# Patient Record
Sex: FEMALE | Race: WHITE | Employment: PART TIME | RURAL
[De-identification: names, ages, dates, MRNs, and addresses within clinical notes are randomized per-mention and may not be internally consistent; named-entity substitution may affect disease eponyms.]

---

## 2022-01-11 ENCOUNTER — ANESTHESIA EVENT (OUTPATIENT)
Dept: SURGERY | Age: 67
End: 2022-01-11
Payer: MEDICARE

## 2022-01-12 NOTE — ANESTHESIA PREPROCEDURE EVALUATION
Relevant Problems   No relevant active problems       Anesthetic History   No history of anesthetic complications            Review of Systems / Medical History  Patient summary reviewed, nursing notes reviewed and pertinent labs reviewed    Pulmonary  Within defined limits            Pertinent negatives: No sleep apnea     Neuro/Psych         Psychiatric history (depression)     Cardiovascular    Hypertension          Hyperlipidemia    Exercise tolerance: >4 METS     GI/Hepatic/Renal             Pertinent negatives: No hiatal hernia and GERD   Endo/Other        Morbid obesity (BMI 40)     Other Findings            Physical Exam    Airway  Mallampati: II  TM Distance: > 6 cm  Neck ROM: normal range of motion   Mouth opening: Normal     Cardiovascular    Rhythm: regular  Rate: normal         Dental  No notable dental hx       Pulmonary  Breath sounds clear to auscultation               Abdominal  GI exam deferred       Other Findings            Anesthetic Plan    ASA: 3  Anesthesia type: MAC          Induction: Intravenous  Anesthetic plan and risks discussed with: Patient

## 2022-01-13 RX ORDER — SIMVASTATIN 40 MG/1
TABLET, FILM COATED ORAL
COMMUNITY

## 2022-01-13 RX ORDER — CHOLECALCIFEROL (VITAMIN D3) 125 MCG
220 CAPSULE ORAL DAILY
COMMUNITY

## 2022-01-13 RX ORDER — LANOLIN ALCOHOL/MO/W.PET/CERES
1000 CREAM (GRAM) TOPICAL DAILY
COMMUNITY

## 2022-01-13 RX ORDER — BUPROPION HYDROCHLORIDE 300 MG/1
300 TABLET ORAL DAILY
COMMUNITY

## 2022-01-13 RX ORDER — HYDROCHLOROTHIAZIDE 25 MG/1
25 TABLET ORAL DAILY
COMMUNITY

## 2022-01-13 RX ORDER — ESCITALOPRAM OXALATE 20 MG/1
20 TABLET ORAL
COMMUNITY

## 2022-01-13 RX ORDER — CHOLECALCIFEROL (VITAMIN D3) 50 MCG
CAPSULE ORAL DAILY
COMMUNITY

## 2022-01-13 NOTE — PERIOP NOTES
[address] [place] [address]  SURGICAL PRE-ADMISSION INSTRUCTIONS    ARRIVAL  · You will be called the day before your surgery with your expected arrival time. · Sign in at the  of the hospital.  You will be directed to the Surgical Waiting Room. · Please arrive at your scheduled appointment time. You have been scheduled to arrive for your procedure one or two hours prior to the expected start time of your procedure. · Every effort will be made to minimize your wait but please be aware that unforeseen circumstances may affect our schedule. EATING  · DO NOT EAT OR DRINK ANYTHING AFTER MIDNIGHT ON THE EVENING BEFORE YOUR SURGERY OR ON THE DAY OF YOUR SURGERY except for your medications (as instructed) with a sip of water. · Do not use gum, mints or lozenges on the morning of your surgery. · Please do not smoke or chew tobacco before your surgery. MEDICATIONS   · none    STOP THESE MEDICATIONS AT THE TIMES LISTED BELOW  aleve     DRIVING/TRANSPORATION  · Have a responsible adult to drive you home from the hospital and to stay with you over night. Please have them plan to remain in the hospital during your surgery. Your surgery will not be done if you do not have a responsible adult to take you home and to stay with you. · If you have arranged for public transport, you must have a responsible adult to ride with you (who is not the ). · You may not drive for 24 hours after anesthesia. PREPARATION  · If you have a Living WiIl/Advance Directive, please bring a copy with you to scan into your chart. · Please DO NOT wear makeup or nail polish  · Please leave valuables at home,  DO NOT wear jewelry. · Wear loose, comfortable clothing that is large enough to cover a bulky dressing.     SPECIAL INSTRUCTIONS:      Patient:  Dina Dixon   Date:     January 13, 2022  Time:   2:46 PM    RN:  Corey Cooley RN    Date:     January 13, 2022  Time:   2:46 PM

## 2022-01-20 ENCOUNTER — HOSPITAL ENCOUNTER (OUTPATIENT)
Dept: PREADMISSION TESTING | Age: 67
Discharge: HOME | End: 2022-01-20
Attending: OPHTHALMOLOGY
Payer: MEDICARE

## 2022-01-20 PROCEDURE — U0005 INFEC AGEN DETEC AMPLI PROBE: HCPCS

## 2022-01-21 LAB
SARS-COV-2, XPLCVT: NOT DETECTED
SOURCE, COVRS: NORMAL

## 2022-01-23 PROBLEM — H25.11 AGE-RELATED NUCLEAR CATARACT, RIGHT EYE: Chronic | Status: ACTIVE | Noted: 2022-01-23

## 2022-01-24 ENCOUNTER — ANESTHESIA (OUTPATIENT)
Dept: SURGERY | Age: 67
End: 2022-01-24
Payer: MEDICARE

## 2022-01-24 ENCOUNTER — HOSPITAL ENCOUNTER (OUTPATIENT)
Age: 67
Setting detail: OUTPATIENT SURGERY
Discharge: HOME | End: 2022-01-24
Attending: OPHTHALMOLOGY | Admitting: OPHTHALMOLOGY
Payer: MEDICARE

## 2022-01-24 VITALS
RESPIRATION RATE: 16 BRPM | OXYGEN SATURATION: 100 % | HEART RATE: 61 BPM | SYSTOLIC BLOOD PRESSURE: 146 MMHG | BODY MASS INDEX: 40.82 KG/M2 | TEMPERATURE: 97.4 F | HEIGHT: 66 IN | DIASTOLIC BLOOD PRESSURE: 56 MMHG | WEIGHT: 254 LBS

## 2022-01-24 PROBLEM — H25.11 AGE-RELATED NUCLEAR CATARACT, RIGHT EYE: Chronic | Status: RESOLVED | Noted: 2022-01-23 | Resolved: 2022-01-24

## 2022-01-24 PROCEDURE — 77030035283: Performed by: OPHTHALMOLOGY

## 2022-01-24 PROCEDURE — 74011250636 HC RX REV CODE- 250/636: Performed by: ANESTHESIOLOGY

## 2022-01-24 PROCEDURE — 74011250636 HC RX REV CODE- 250/636: Performed by: OPHTHALMOLOGY

## 2022-01-24 PROCEDURE — V2632 POST CHMBR INTRAOCULAR LENS: HCPCS | Performed by: OPHTHALMOLOGY

## 2022-01-24 PROCEDURE — 76210000020 HC REC RM PH II FIRST 0.5 HR: Performed by: OPHTHALMOLOGY

## 2022-01-24 PROCEDURE — 76060000031 HC ANESTHESIA FIRST 0.5 HR: Performed by: OPHTHALMOLOGY

## 2022-01-24 PROCEDURE — 2709999900 HC NON-CHARGEABLE SUPPLY: Performed by: OPHTHALMOLOGY

## 2022-01-24 PROCEDURE — 77030014067 HC TIP PHACO KLMN ALCN -B: Performed by: OPHTHALMOLOGY

## 2022-01-24 PROCEDURE — 77030007855 HC KNF OPHTH IKNF ALCN -A: Performed by: OPHTHALMOLOGY

## 2022-01-24 PROCEDURE — 77030013987 HC SLV OPTH IRR ALCN -B: Performed by: OPHTHALMOLOGY

## 2022-01-24 PROCEDURE — 76010000154 HC OR TIME FIRST 0.5 HR: Performed by: OPHTHALMOLOGY

## 2022-01-24 PROCEDURE — 77030013353: Performed by: OPHTHALMOLOGY

## 2022-01-24 PROCEDURE — 77030018831 HC SOL IRR H20 BAXT -A: Performed by: OPHTHALMOLOGY

## 2022-01-24 PROCEDURE — 74011000250 HC RX REV CODE- 250: Performed by: OPHTHALMOLOGY

## 2022-01-24 PROCEDURE — 77030020828: Performed by: OPHTHALMOLOGY

## 2022-01-24 DEVICE — LENS IOL POST 1-PC 6X13 24.5 -- ACRYSOF: Type: IMPLANTABLE DEVICE | Site: EYE | Status: FUNCTIONAL

## 2022-01-24 RX ORDER — MIDAZOLAM HYDROCHLORIDE 1 MG/ML
INJECTION, SOLUTION INTRAMUSCULAR; INTRAVENOUS AS NEEDED
Status: DISCONTINUED | OUTPATIENT
Start: 2022-01-24 | End: 2022-01-24 | Stop reason: HOSPADM

## 2022-01-24 RX ORDER — TROPICAMIDE 10 MG/ML
1 SOLUTION/ DROPS OPHTHALMIC
Status: COMPLETED | OUTPATIENT
Start: 2022-01-24 | End: 2022-01-24

## 2022-01-24 RX ORDER — TETRACAINE HYDROCHLORIDE 5 MG/ML
1 SOLUTION OPHTHALMIC AS NEEDED
Status: COMPLETED | OUTPATIENT
Start: 2022-01-24 | End: 2022-01-24

## 2022-01-24 RX ORDER — LIDOCAINE HYDROCHLORIDE 20 MG/ML
2 INJECTION, SOLUTION EPIDURAL; INFILTRATION; INTRACAUDAL; PERINEURAL ONCE
Status: COMPLETED | OUTPATIENT
Start: 2022-01-24 | End: 2022-01-24

## 2022-01-24 RX ORDER — SODIUM CHLORIDE, SODIUM LACTATE, POTASSIUM CHLORIDE, CALCIUM CHLORIDE 600; 310; 30; 20 MG/100ML; MG/100ML; MG/100ML; MG/100ML
25 INJECTION, SOLUTION INTRAVENOUS CONTINUOUS
Status: DISCONTINUED | OUTPATIENT
Start: 2022-01-24 | End: 2022-01-24 | Stop reason: HOSPADM

## 2022-01-24 RX ORDER — TOBRAMYCIN AND DEXAMETHASONE 3; 1 MG/ML; MG/ML
1 SUSPENSION/ DROPS OPHTHALMIC AS NEEDED
Status: COMPLETED | OUTPATIENT
Start: 2022-01-24 | End: 2022-01-24

## 2022-01-24 RX ORDER — TETRACAINE HYDROCHLORIDE 5 MG/ML
SOLUTION OPHTHALMIC AS NEEDED
Status: DISCONTINUED | OUTPATIENT
Start: 2022-01-24 | End: 2022-01-24 | Stop reason: HOSPADM

## 2022-01-24 RX ORDER — PHENYLEPHRINE HYDROCHLORIDE 100 MG/ML
1 SOLUTION/ DROPS OPHTHALMIC
Status: COMPLETED | OUTPATIENT
Start: 2022-01-24 | End: 2022-01-24

## 2022-01-24 RX ORDER — KETOROLAC TROMETHAMINE 5 MG/ML
1 SOLUTION OPHTHALMIC
Status: COMPLETED | OUTPATIENT
Start: 2022-01-24 | End: 2022-01-24

## 2022-01-24 RX ORDER — CHOLECALCIFEROL TAB 125 MCG (5000 UNIT) 125 MCG
5000 TAB ORAL DAILY
COMMUNITY

## 2022-01-24 RX ADMIN — KETOROLAC TROMETHAMINE 1 DROP: 0.5 SOLUTION OPHTHALMIC at 12:53

## 2022-01-24 RX ADMIN — SODIUM CHLORIDE, POTASSIUM CHLORIDE, SODIUM LACTATE AND CALCIUM CHLORIDE 25 ML/HR: 600; 310; 30; 20 INJECTION, SOLUTION INTRAVENOUS at 12:34

## 2022-01-24 RX ADMIN — TETRACAINE HYDROCHLORIDE 1 DROP: 5 SOLUTION OPHTHALMIC at 12:39

## 2022-01-24 RX ADMIN — TROPICAMIDE 1 DROP: 10 SOLUTION/ DROPS OPHTHALMIC at 12:53

## 2022-01-24 RX ADMIN — LIDOCAINE HYDROCHLORIDE 2 DROP: 35 GEL OPHTHALMIC at 12:39

## 2022-01-24 RX ADMIN — LIDOCAINE HYDROCHLORIDE 2 DROP: 35 GEL OPHTHALMIC at 12:53

## 2022-01-24 RX ADMIN — TETRACAINE HYDROCHLORIDE 1 DROP: 5 SOLUTION OPHTHALMIC at 12:52

## 2022-01-24 RX ADMIN — LIDOCAINE HYDROCHLORIDE 2 DROP: 35 GEL OPHTHALMIC at 12:28

## 2022-01-24 RX ADMIN — PHENYLEPHRINE HYDROCHLORIDE 1 DROP: 100 SOLUTION/ DROPS OPHTHALMIC at 12:28

## 2022-01-24 RX ADMIN — KETOROLAC TROMETHAMINE 1 DROP: 0.5 SOLUTION OPHTHALMIC at 12:29

## 2022-01-24 RX ADMIN — TROPICAMIDE 1 DROP: 10 SOLUTION/ DROPS OPHTHALMIC at 12:39

## 2022-01-24 RX ADMIN — SODIUM CHLORIDE, POTASSIUM CHLORIDE, SODIUM LACTATE AND CALCIUM CHLORIDE: 600; 310; 30; 20 INJECTION, SOLUTION INTRAVENOUS at 13:04

## 2022-01-24 RX ADMIN — MIDAZOLAM 1 MG: 1 INJECTION INTRAMUSCULAR; INTRAVENOUS at 13:06

## 2022-01-24 RX ADMIN — KETOROLAC TROMETHAMINE 1 DROP: 0.5 SOLUTION OPHTHALMIC at 12:39

## 2022-01-24 RX ADMIN — PHENYLEPHRINE HYDROCHLORIDE 1 DROP: 100 SOLUTION/ DROPS OPHTHALMIC at 12:52

## 2022-01-24 RX ADMIN — TETRACAINE HYDROCHLORIDE 1 DROP: 5 SOLUTION OPHTHALMIC at 12:28

## 2022-01-24 RX ADMIN — MIDAZOLAM 1 MG: 1 INJECTION INTRAMUSCULAR; INTRAVENOUS at 13:12

## 2022-01-24 RX ADMIN — TROPICAMIDE 1 DROP: 10 SOLUTION/ DROPS OPHTHALMIC at 12:28

## 2022-01-24 RX ADMIN — PHENYLEPHRINE HYDROCHLORIDE 1 DROP: 100 SOLUTION/ DROPS OPHTHALMIC at 12:39

## 2022-01-24 NOTE — INTERVAL H&P NOTE
The patient was counseled at length about the risks of rajendra Covid-19 during their perioperative period and any recovery window from their procedure. The patient was made aware that rajendra Covid-19  may worsen their prognosis for recovering from their procedure and lend to a higher morbidity and/or mortality risk. All material risks, benefits, and reasonable alternatives including postponing the procedure were discussed. The patient does  wish to proceed with the procedure at this time. Update History & Physical    The Patient's History and Physical of January 6, 2022 was reviewed with the patient and I examined the patient. There was no change. The surgical site was confirmed by the patient and me. Plan:  The risk, benefits, expected outcome, and alternative to the recommended procedure have been discussed with the patient. Patient understands and wants to proceed with the procedure.     Electronically signed by Gerome Duverney, MD on 1/24/2022 at 12:52 PM

## 2022-01-24 NOTE — BRIEF OP NOTE
Brief Postoperative Note    Patient: Candance Seashore  YOB: 1955  MRN: [id number]    Date of Procedure: 1/24/2022     Pre-Op Diagnosis: RIGHT EYE CATARACT    Post-Op Diagnosis: pseudophakia righ eye      Procedure(s):  RIGHT EYE EXTRACAPSULAR CATARACT REMOVAL WITH INSERTION OF INTRA OCULAR LENS IMPLANT (MAC/TOPICAL)    Surgeon(s):  Blas Keenan MD    Surgical Assistant: None    Anesthesia: MAC     Estimated Blood Loss (mL): none    Complications: None    Specimens: * No specimens in log *     Implants:   Implant Name Type Inv.  Item Serial No.  Lot No. LRB No. Used Action   AcrySof IQ Intraocular Lens  [id number] [address] [place] [address]  Right 1 Implanted       Drains: * No LDAs found *    Findings: cataract    Electronically Signed by Laura Rollins MD on 1/24/2022 at 1:32 PM

## 2022-01-24 NOTE — DISCHARGE INSTRUCTIONS
[facility]      POST OPERATIVE INSTRUCTIONS AND INFORMATION         1. THE FIRST 24 HOURS AFTER SURGERY, YOU WILL BE ASKED TO REMAIN AT BEDREST WITH YOUR HEAD ELEVATED AT 39 DEGREES THIS CAN BE DONE BY SLEEPING ON THE PILLOWS OR IN A RECLINER. YOU CAN GET UP AS NECESSARY. IF AN EYE SHIELD IS PLACED, IT  MUST REMAIN FIRMLY IN PLACE FOR THE FIRST 24 HOURS. IT WILL BE REMOVED IN THE OFFICE ON THE DAY FOLLOWING SURGERY WHEN I EXAMINE YOUR EYE. IF YOU ARE DISCHARGED WITH SUN GLASSES, THEY ARE TO BE WORN UNTIL BEDTIME WHEN AN EYE SHIELD IS TO BE TAPED OVER THE OPERATED EYE FOR SLEEP. 2.  YOU WILL NEED TO BRING ALL EYE MEDICATIONS TO YOUR APPOINTMENT THE DAY AFTER SURGERY. 3.   YOUR POST OP VISIT SCHEDULE IS AS FOLLOWS:             * ONE DAY AFTER SURGERY             * ONE WEEK AFTER SURGERY             * SIX WEEKS AFTER SURGERY (SPECTACLE REFRACTION AND DILATED EXAMINATION)    4. IT IS IMPORTANT THAT YOU WEAR EYE PROTECTION AT ALL TIMES FOR THE      FIRST WEEK AFTER [facility] CATARACT SURGERY. DURING THE DAY, YOU WILL NEED TO WEAR EITHER OUR CURRENT SPECTACLES WITH A PLAIN WINDOW GLASS LENS OR YOU MAY WISH TO PURCHASE A PAIR OF DRUG STORE BIFOCALS WITH A POWER OF +2.50 OR SO. WHEN OUTSIDE, YOU MUST WEAR THE SOLAR RODRIGUEZ THAT ARE PROVIDED FOR YOU. AT BEDTIME, YOU MUST WEAR THE EYE SHIELD THAT IS ALSO PROVIDED. AGAIN THIS IS FOR THE FIRST WEEK FOLLOWING YOUR SURGERY.     5. IMPORTANT DOS AND DONTS:             *AVOID CONSTIPATION             *DO NOT PARTICIPATE IN SPORTS OR STRENUOUS PHYSICAL ACTIVITY INCLUDING                         SEXUAL RELATIONS             *DO NOT DRIVE FOR ONE WEEK OFTER EACH CATARACT SURGERY             *AVOID POWDERS, SPRAYS, OR OTHER THINGS THAT MIGHT GET IN YOUR EYES             *DO NOT RUB YOUR EYE OR PUT ANY PRESSURE ON YOUR EYE    6. IF YOUR HAIR IS WASHED BY SOMEONE ELSE, HAVE THEM POSITION YOU SO THAT YOU LEAN YOUR HEAD BACKWARDS INTO THE SINK TO AVOID SOAPY WATER FROM GETTING IN YOUR EYES. YOU SHOULD ALSO WEAR YOUR EYE SHIELD TO PREVENT INJURY OR CONTAMINATION TO THE EYE. 7. DO NOT HESITATE TO CALL OUR OFFICE IF YOU FEEL SOMETHING IS NOT RIGHT OR YOU HAVE ANY QUESTIONS, UNUSUAL SYMPTOMS, OR SUDDEN CHANGES IN YOUR VISION. 8. OUR TELEPHONE [facility] OFFICE              [phone]              *[facility] OFFICE         [phone]    9. [address] YOU FOR ENTRUSTING YOUR EYE CARE TO US.    10.  YOU HAVE RECEIVED SEDATION AS PART OF YOUR PROCEDURE. NO DRIVING OR OPERATING HEAVY MACHINERY FOR 24 HOURS. YOUR                       BALANCE AND JUDGEMENT MAY BE IMPAIRED. DROWSINESS MAY ALSO OCCUR. [address] [place] [address] Rd  [phone]      ACTIVITY:    · Please, Bed rest for the remainder fo the day. · No bending, stooping, lifting or straining. · Do not drive until Dr. Nash Ramires give permission. DIET:    · Please resume your usual diet. MEDICATIONS:    · Pain medication may be ordered for you by your physician. · You may take a laxative if needed to avoid constipation. · Take tylenol for discomfort.     WOUND CARE:    · Please leave patch over eye until scheduled office visit  · Sleep on two pillows tonight    OTHER INSTRUCTIONS:    · Call your doctor immediately for:  · Excessive bleeding  · Worsening pain  · Temperature greater than 101 degrees  · Sudden change in vision      I have ready these instructions and understand them    Patient: Sunday Rodolfo  Date:     January 24, 2022 Time:   12:14 PM  RN:  Alphonse Gitelman, RN  Date:     January 24, 2022 Time:   12:14 PM

## 2022-01-24 NOTE — ANESTHESIA POSTPROCEDURE EVALUATION
Procedure(s):  RIGHT EYE EXTRACAPSULAR CATARACT REMOVAL WITH INSERTION OF INTRA OCULAR LENS IMPLANT (MAC/TOPICAL).     MAC    Anesthesia Post Evaluation      Multimodal analgesia: multimodal analgesia used between 6 hours prior to anesthesia start to PACU discharge  Patient location during evaluation: PACU  Level of consciousness: awake  Pain score: 0  Airway patency: patent  Anesthetic complications: no  Hydration status: acceptable  Post anesthesia nausea and vomiting:  none  Final Post Anesthesia Temperature Assessment:  Normothermia (36.0-37.5 degrees C)      INITIAL Post-op Vital signs:   Vitals Value Taken Time   BP     Temp 36.3 °C (97.4 °F) 01/24/22 1332   Pulse 61 01/24/22 1332   Resp 16 01/24/22 1332   SpO2 100 % 01/24/22 1332

## 2022-01-24 NOTE — OP NOTES
[facility]  OPERATIVE REPORT    Name:  Brie Echeverria  MR#:  [id number]  :  1955  ACCOUNT #:  [de-identified]  DATE OF SERVICE:  2022    PREOPERATIVE DIAGNOSIS:  Age-related nuclear cataract, right eye. POSTOPERATIVE DIAGNOSIS:  Pseudophakia, right eye. PROCEDURE PERFORMED:  Phacoemulsification of cataract of right eye with intraocular lens implant. SURGEON:  Sarah Lopez MD    ASSISTANT:  None. ANESTHESIA:  Topical 3.5% lidocaine gel, 0.5% tetracaine drops, IV sedation by Anesthesia and 2% preservative-free intraocular lidocaine. COMPLICATIONS:  None. SPECIMENS REMOVED:  None. IMPLANTS:  IOL. ESTIMATED BLOOD LOSS:  None. INDICATIONS:  This 69-year-old white female noted painless progressive decreased visual acuity in both eyes secondary to cataract formation affecting distance and near vision, driving, and reading, and not improved by refraction. She presents for an elective extracapsular cataract extraction with lens implant of right eye to improve vision and lifestyle. In addition to cataracts, she has a history of depression, hypertension, elevated cholesterol, and precancerous skin lesions of the face which she has had biopsy. Her current medications include Wellbutrin, vitamin D3, Lexapro, HydroDIURIL, Aleve, omega-3 fatty acids, Zocor, and vitamin B12. She has allergies to duloxetine, guanfacine, and sertraline. She was cleared for surgery by Walt Arechiga, nurse practitioner. Ocular examination at time of admission reveals a best corrected visual acuity with glare of 20/70 in the right eye and 20/60 in the left eye and intraocular pressures of 14 mmHg in the right eye and 16 mmHg in the left eye. Extraocular examination shows full fields to confrontation. Normal motility with acne rosacea, ptosis, and dermatochalasis, both eyes.   Anterior segment shows normal conjunctiva with clear corneas, shallow angles, shallow chambers, normal iris, and pupil, and she dilates well. She has 6+ nuclear sclerotic lens changes, both eyes. Dilated fundus shows a 0.2 cup-to-disk ratio, normal blood vessels, and dull foveal reflexes. PROCEDURE:  The patient was taken to the main operating room after receiving pupillary dilation, application of Honan balloon and topical anesthesia in the preop area, placed in the supine position and given IV sedation by Anesthesia. The patient was prepped and draped in the standard fashion for intraocular microsurgery of the right eye with a temporal approach. A limbal paracentesis was made with a 15-degree blade and the anterior chamber filled with Viscoat. A constantino keratome was used to enter the anterior chamber from the temporal limbus in a four-plane fashion creating a 3 mm self-sealing corneal tunnel incision. An anterior capsulorrhexis was performed with capsulorrhexis forceps followed by hydrodissection and hydrodelineation of the nucleus in the capsular bag with balanced salt solution achieving rotation. A CDE of 5.23, phacoemulsification time of 53.9 seconds, at an average power of 3.7% was required to remove the nucleus in a phaco chop technique in burst mode at high vacuum using OZil technology followed by irrigation/aspiration of the remaining cortex and vacuum polishing of the posterior capsule. The capsular bag was then filled with Provisc and an Cameron AcrySof posterior chamber foldable acrylic intraocular lens, model SN60WF, power 24.5 diopters, was injected into the capsular bag using an Cameron cartridge and the lens centered. Irrigation/aspiration was used to remove the Provisc from the anterior chamber and capsular bag. The anterior chamber was filled with balanced salt solution and the incisions tested and found to be watertight without a suture. A collagen shield soaked in Tobradex ophthalmic drops and tetracaine drops was placed on the cornea followed by Pred-G ophthalmic ointment.   The speculum and drape were then removed and an eye shield taped over the eye. The patient tolerated the procedure well and left the operating room for the step-down unit under the care of Anesthesia, in a satisfactory postop condition, alert and awake. The patient is to be discharged home when released by Anesthesia, to remain at bed rest with head elevated for the next 24 hours, resume all customary preop diet and medications and take Tylenol as needed for discomfort. A followup appointment is scheduled in [address] office on 01/25/2022 at 3:45 p.m.       Ruth Serrano MD      [id number]  D:  01/24/2022 13:39  T:  01/24/2022 16:43  JOB #:  [id number]

## 2022-01-27 ENCOUNTER — HOSPITAL ENCOUNTER (OUTPATIENT)
Dept: PREADMISSION TESTING | Age: 67
Discharge: HOME | End: 2022-01-27
Attending: OPHTHALMOLOGY
Payer: MEDICARE

## 2022-01-27 ENCOUNTER — ANESTHESIA EVENT (OUTPATIENT)
Dept: SURGERY | Age: 67
End: 2022-01-27
Payer: MEDICARE

## 2022-01-27 PROCEDURE — U0005 INFEC AGEN DETEC AMPLI PROBE: HCPCS

## 2022-01-28 LAB
SARS-COV-2, XPLCVT: NOT DETECTED
SOURCE, COVRS: NORMAL

## 2022-01-30 PROBLEM — H25.12 AGE-RELATED NUCLEAR CATARACT, LEFT EYE: Chronic | Status: ACTIVE | Noted: 2022-01-30

## 2022-01-31 ENCOUNTER — ANESTHESIA (OUTPATIENT)
Dept: SURGERY | Age: 67
End: 2022-01-31
Payer: MEDICARE

## 2022-01-31 ENCOUNTER — HOSPITAL ENCOUNTER (OUTPATIENT)
Age: 67
Setting detail: OUTPATIENT SURGERY
Discharge: HOME | End: 2022-01-31
Attending: OPHTHALMOLOGY | Admitting: OPHTHALMOLOGY
Payer: MEDICARE

## 2022-01-31 VITALS
DIASTOLIC BLOOD PRESSURE: 64 MMHG | RESPIRATION RATE: 18 BRPM | TEMPERATURE: 98 F | BODY MASS INDEX: 40.82 KG/M2 | OXYGEN SATURATION: 97 % | SYSTOLIC BLOOD PRESSURE: 154 MMHG | WEIGHT: 254 LBS | HEIGHT: 66 IN | HEART RATE: 65 BPM

## 2022-01-31 PROBLEM — H25.12 AGE-RELATED NUCLEAR CATARACT, LEFT EYE: Chronic | Status: RESOLVED | Noted: 2022-01-30 | Resolved: 2022-01-31

## 2022-01-31 PROCEDURE — 77030007855 HC KNF OPHTH IKNF ALCN -A: Performed by: OPHTHALMOLOGY

## 2022-01-31 PROCEDURE — 76210000020 HC REC RM PH II FIRST 0.5 HR: Performed by: OPHTHALMOLOGY

## 2022-01-31 PROCEDURE — V2632 POST CHMBR INTRAOCULAR LENS: HCPCS | Performed by: OPHTHALMOLOGY

## 2022-01-31 PROCEDURE — 77030013353: Performed by: OPHTHALMOLOGY

## 2022-01-31 PROCEDURE — 76060000031 HC ANESTHESIA FIRST 0.5 HR: Performed by: OPHTHALMOLOGY

## 2022-01-31 PROCEDURE — 76010000154 HC OR TIME FIRST 0.5 HR: Performed by: OPHTHALMOLOGY

## 2022-01-31 PROCEDURE — 2709999900 HC NON-CHARGEABLE SUPPLY: Performed by: OPHTHALMOLOGY

## 2022-01-31 PROCEDURE — 74011250636 HC RX REV CODE- 250/636: Performed by: ANESTHESIOLOGY

## 2022-01-31 PROCEDURE — 77030013987 HC SLV OPTH IRR ALCN -B: Performed by: OPHTHALMOLOGY

## 2022-01-31 PROCEDURE — 77030035283: Performed by: OPHTHALMOLOGY

## 2022-01-31 PROCEDURE — 74011250636 HC RX REV CODE- 250/636: Performed by: OPHTHALMOLOGY

## 2022-01-31 PROCEDURE — 74011000250 HC RX REV CODE- 250: Performed by: OPHTHALMOLOGY

## 2022-01-31 PROCEDURE — 77030020828: Performed by: OPHTHALMOLOGY

## 2022-01-31 PROCEDURE — 77030014067 HC TIP PHACO KLMN ALCN -B: Performed by: OPHTHALMOLOGY

## 2022-01-31 PROCEDURE — 77030018831 HC SOL IRR H20 BAXT -A: Performed by: OPHTHALMOLOGY

## 2022-01-31 DEVICE — LENS IOL POST 1-PC 6X13 22.0 -- ACRYSOF: Type: IMPLANTABLE DEVICE | Site: EYE | Status: FUNCTIONAL

## 2022-01-31 RX ORDER — TOBRAMYCIN AND DEXAMETHASONE 3; 1 MG/ML; MG/ML
1 SUSPENSION/ DROPS OPHTHALMIC AS NEEDED
Status: COMPLETED | OUTPATIENT
Start: 2022-01-31 | End: 2022-01-31

## 2022-01-31 RX ORDER — MIDAZOLAM HYDROCHLORIDE 1 MG/ML
INJECTION, SOLUTION INTRAMUSCULAR; INTRAVENOUS AS NEEDED
Status: DISCONTINUED | OUTPATIENT
Start: 2022-01-31 | End: 2022-01-31 | Stop reason: HOSPADM

## 2022-01-31 RX ORDER — PREDNISOLONE ACETATE 10 MG/ML
SUSPENSION/ DROPS OPHTHALMIC
COMMUNITY
Start: 2022-01-27

## 2022-01-31 RX ORDER — TETRACAINE HYDROCHLORIDE 5 MG/ML
SOLUTION OPHTHALMIC AS NEEDED
Status: DISCONTINUED | OUTPATIENT
Start: 2022-01-31 | End: 2022-01-31 | Stop reason: HOSPADM

## 2022-01-31 RX ORDER — TROPICAMIDE 10 MG/ML
1 SOLUTION/ DROPS OPHTHALMIC
Status: COMPLETED | OUTPATIENT
Start: 2022-01-31 | End: 2022-01-31

## 2022-01-31 RX ORDER — LIDOCAINE HYDROCHLORIDE 20 MG/ML
2 INJECTION, SOLUTION EPIDURAL; INFILTRATION; INTRACAUDAL; PERINEURAL ONCE
Status: COMPLETED | OUTPATIENT
Start: 2022-01-31 | End: 2022-01-31

## 2022-01-31 RX ORDER — TOBRAMYCIN 3 MG/ML
SOLUTION/ DROPS OPHTHALMIC
COMMUNITY
Start: 2022-01-27

## 2022-01-31 RX ORDER — KETOROLAC TROMETHAMINE 5 MG/ML
1 SOLUTION OPHTHALMIC
Status: COMPLETED | OUTPATIENT
Start: 2022-01-31 | End: 2022-01-31

## 2022-01-31 RX ORDER — GENTAMICIN SULFATE 1 MG/G
OINTMENT TOPICAL
Status: ON HOLD | COMMUNITY
Start: 2021-11-10 | End: 2022-01-31

## 2022-01-31 RX ORDER — TETRACAINE HYDROCHLORIDE 5 MG/ML
1 SOLUTION OPHTHALMIC AS NEEDED
Status: COMPLETED | OUTPATIENT
Start: 2022-01-31 | End: 2022-01-31

## 2022-01-31 RX ORDER — SODIUM CHLORIDE, SODIUM LACTATE, POTASSIUM CHLORIDE, CALCIUM CHLORIDE 600; 310; 30; 20 MG/100ML; MG/100ML; MG/100ML; MG/100ML
25 INJECTION, SOLUTION INTRAVENOUS CONTINUOUS
Status: DISCONTINUED | OUTPATIENT
Start: 2022-01-31 | End: 2022-01-31 | Stop reason: HOSPADM

## 2022-01-31 RX ORDER — PROPOFOL 10 MG/ML
INJECTION, EMULSION INTRAVENOUS AS NEEDED
Status: DISCONTINUED | OUTPATIENT
Start: 2022-01-31 | End: 2022-01-31 | Stop reason: HOSPADM

## 2022-01-31 RX ORDER — PHENYLEPHRINE HYDROCHLORIDE 100 MG/ML
1 SOLUTION/ DROPS OPHTHALMIC
Status: COMPLETED | OUTPATIENT
Start: 2022-01-31 | End: 2022-01-31

## 2022-01-31 RX ADMIN — TETRACAINE HYDROCHLORIDE 1 DROP: 5 SOLUTION OPHTHALMIC at 11:52

## 2022-01-31 RX ADMIN — PROPOFOL 20 MG: 10 INJECTION, EMULSION INTRAVENOUS at 12:29

## 2022-01-31 RX ADMIN — TETRACAINE HYDROCHLORIDE 1 DROP: 5 SOLUTION OPHTHALMIC at 11:42

## 2022-01-31 RX ADMIN — PHENYLEPHRINE HYDROCHLORIDE 1 DROP: 100 SOLUTION/ DROPS OPHTHALMIC at 12:03

## 2022-01-31 RX ADMIN — TROPICAMIDE 1 DROP: 10 SOLUTION/ DROPS OPHTHALMIC at 12:03

## 2022-01-31 RX ADMIN — LIDOCAINE HYDROCHLORIDE 2 DROP: 35 GEL OPHTHALMIC at 11:43

## 2022-01-31 RX ADMIN — KETOROLAC TROMETHAMINE 1 DROP: 0.5 SOLUTION OPHTHALMIC at 12:03

## 2022-01-31 RX ADMIN — TETRACAINE HYDROCHLORIDE 1 DROP: 5 SOLUTION OPHTHALMIC at 12:03

## 2022-01-31 RX ADMIN — LIDOCAINE HYDROCHLORIDE 2 DROP: 35 GEL OPHTHALMIC at 12:03

## 2022-01-31 RX ADMIN — SODIUM CHLORIDE, POTASSIUM CHLORIDE, SODIUM LACTATE AND CALCIUM CHLORIDE 25 ML/HR: 600; 310; 30; 20 INJECTION, SOLUTION INTRAVENOUS at 11:32

## 2022-01-31 RX ADMIN — KETOROLAC TROMETHAMINE 1 DROP: 0.5 SOLUTION OPHTHALMIC at 11:43

## 2022-01-31 RX ADMIN — MIDAZOLAM 2 MG: 1 INJECTION INTRAMUSCULAR; INTRAVENOUS at 12:27

## 2022-01-31 RX ADMIN — PHENYLEPHRINE HYDROCHLORIDE 1 DROP: 100 SOLUTION/ DROPS OPHTHALMIC at 11:43

## 2022-01-31 RX ADMIN — TROPICAMIDE 1 DROP: 10 SOLUTION/ DROPS OPHTHALMIC at 11:43

## 2022-01-31 RX ADMIN — KETOROLAC TROMETHAMINE 1 DROP: 0.5 SOLUTION OPHTHALMIC at 11:53

## 2022-01-31 RX ADMIN — TROPICAMIDE 1 DROP: 10 SOLUTION/ DROPS OPHTHALMIC at 11:52

## 2022-01-31 RX ADMIN — LIDOCAINE HYDROCHLORIDE 2 DROP: 35 GEL OPHTHALMIC at 11:53

## 2022-01-31 RX ADMIN — PHENYLEPHRINE HYDROCHLORIDE 1 DROP: 100 SOLUTION/ DROPS OPHTHALMIC at 11:53

## 2022-01-31 NOTE — OP NOTES
[facility]  OPERATIVE REPORT    Name:  Sandee Gutierrez  MR#:  [id number]  :  1955  ACCOUNT #:  [de-identified]  DATE OF SERVICE:  2022    PREOPERATIVE DIAGNOSIS:  Age-related nuclear cataract, left eye. POSTOPERATIVE DIAGNOSIS:  Pseudophakia, left eye. PROCEDURE PERFORMED:  Phacoemulsification of cataract, left eye, with intraocular lens implant. SURGEON:  Caio Chase MD    ASSISTANT:  None. ANESTHESIA:  Topical 3.5% lidocaine gel, 0.5% tetracaine drops, IV sedation by Anesthesia, and 2% preservative-free intraocular lidocaine. COMPLICATIONS:  None. SPECIMENS REMOVED:  None. IMPLANTS:  IOL. ESTIMATED BLOOD LOSS:  None. INDICATIONS:  This 17-year-old white female noted painless progressive decreased visual acuity in both eyes secondary to cataract formation affecting distance and near vision, driving, and reading, and not improved by refraction. She underwent phacoemulsification of cataract with lens implant in her right eye on 2022 and presents now for an elective extracapsular cataract extraction with lens implant in her left eye to improve vision and lifestyle. In addition to cataracts, she has a history of depression, has had skin biopsies, hypertension, and elevated cholesterol, and recurrent precancerous lesions of her face. CURRENT MEDICATIONS:  Include:  1. Wellbutrin. 2.  Vitamin D3. 3.  Lexapro. 4.  HydroDIURIL. 5.  Aleve. 6.  Omega-3.  7.  Vitamins. 8.  Zocor. 9.  Cyanocobalamin. 10.  Chemo cream on her face. ALLERGIES:  SHE HAS ALLERGIES TO CYMBALTA. She was cleared for surgery for Carlie Steward, nurse practitioner. Ocular examination at time of admission reveals an uncorrected acuity of 20/50 in the right eye and a best corrected acuity of 20/60 in the left eye with glare and intraocular pressures of 13 mmHg in the right eye and 15 mmHg in the left eye.   Extraocular examination reveals full fields to confrontation and normal motility with acne rosacea, bilateral ptosis, and dermatochalasis. Anterior segment shows normal conjunctiva with clear corneas and healed incisions in the right eye. Anterior chamber angles are deep and open in the right eye and shallow angle and shallow chamber in the left eye with normal iris and pupil. She dilates well. She has a posterior chamber IOL with clear capsule in the right eye and +6 nuclear sclerotic lens in the left eye. Dilated fundus shows a 0.2 cup-to-disk ratio, normal blood vessels, and dull foveal reflexes. PROCEDURE:  The patient was taken to the main operating room after receiving pupillary dilation, application of Honan balloon and topical anesthesia in the preop area, placed in the supine position and given IV sedation by Anesthesia. The patient was prepped and draped in the standard fashion for intraocular microsurgery of the left eye with a temporal approach. A limbal paracentesis was made with a 15-degree blade and the anterior chamber filled with Viscoat. A constantino keratome was used to enter the anterior chamber from the temporal limbus in a four-plane fashion creating a 3 mm self-sealing corneal tunnel incision. An anterior capsulorrhexis was performed with capsulorrhexis forceps followed by hydrodissection and hydrodelineation of the nucleus in the capsular bag with balanced salt solution achieving rotation. A CDE of 4.64, phacoemulsification time of 42.0 seconds, at an average power of 3.7% was required to remove the nucleus in a phaco chop technique in burst mode at high vacuum using OZil technology followed by irrigation/aspiration of the remaining cortex and vacuum polishing of the posterior capsule.   The capsular bag was then filled with Provisc and an Cameron AcrySof posterior chamber foldable acrylic intraocular lens, model SN60WF, power +22.0 diopters, was injected into the capsular bag using an Cameron cartridge and the lens centered. Irrigation/aspiration was used to remove the Provisc from the anterior chamber and capsular bag. The anterior chamber was filled with balanced salt solution and the incisions tested and found to be watertight without a suture. A collagen shield soaked in Tobradex ophthalmic drops and tetracaine drops was placed on the cornea followed by Pred-G ophthalmic ointment. The speculum and drape were then removed and an eye shield taped over the eye. The patient tolerated the procedure well and left the operating room for the step-down unit under the care of Anesthesia, in a satisfactory postop condition, alert and awake. The patient is to be discharged home when released by Anesthesia, to remain at bed rest with head elevated for the next 24 hours, resume all customary preop diet and medications and take Tylenol as needed for discomfort. A followup appointment is scheduled in my office on 02/01/2022 at 4 p.m in [address] [place] [address].       Ave Fleming MD      [id number]  D:  01/31/2022 12:55  T:  01/31/2022 14:00  JOB #:  [id number]

## 2022-01-31 NOTE — ANESTHESIA POSTPROCEDURE EVALUATION
Procedure(s):  LEFT EYE EXTRACAPSULAR CATARACT REMOVAL WITH INSERTION OF INTRA OCULAR LENS IMPLANT (MAC/TOPICAL).     MAC    Anesthesia Post Evaluation      Multimodal analgesia: multimodal analgesia not used between 6 hours prior to anesthesia start to PACU discharge  Patient location during evaluation: bedside  Patient participation: complete - patient participated  Level of consciousness: awake and alert  Pain score: 0  Pain management: adequate  Airway patency: patent  Anesthetic complications: no  Cardiovascular status: acceptable  Respiratory status: acceptable  Hydration status: acceptable  Post anesthesia nausea and vomiting:  none  Final Post Anesthesia Temperature Assessment:  Normothermia (36.0-37.5 degrees C)      INITIAL Post-op Vital signs:   Vitals Value Taken Time   /64 01/31/22 1245   Temp 36.7 °C (98 °F) 01/31/22 1245   Pulse 65 01/31/22 1245   Resp 18 01/31/22 1245   SpO2 97 % 01/31/22 1245

## 2022-01-31 NOTE — INTERVAL H&P NOTE
The patient was counseled at length about the risks of rajendra Covid-19 during their perioperative period and any recovery window from their procedure. The patient was made aware that rajendra Covid-19  may worsen their prognosis for recovering from their procedure and lend to a higher morbidity and/or mortality risk. All material risks, benefits, and reasonable alternatives including postponing the procedure were discussed. The patient does  wish to proceed with the procedure at this time. Update History & Physical    The Patient's History and Physical of January 6, 2022 was reviewed with the patient and I examined the patient. There was no change. The surgical site was confirmed by the patient and me. Plan:  The risk, benefits, expected outcome, and alternative to the recommended procedure have been discussed with the patient. Patient understands and wants to proceed with the procedure.     Electronically signed by Leoncio Burroughs MD on 1/31/2022 at 12:18 PM

## 2022-01-31 NOTE — BRIEF OP NOTE
Brief Postoperative Note    Patient: Radha Brandon  YOB: 1955  MRN: [id number]    Date of Procedure: 1/31/2022     Pre-Op Diagnosis: LEFT EYE CATARACT    Post-Op Diagnosis: pseudophakia left eye      Procedure(s):  LEFT EYE EXTRACAPSULAR CATARACT REMOVAL WITH INSERTION OF INTRA OCULAR LENS IMPLANT (MAC/TOPICAL)    Surgeon(s):  Frances Cordoba MD    Surgical Assistant: None    Anesthesia: MAC     Estimated Blood Loss (mL): none    Complications: None    Specimens: * No specimens in log *     Implants:   Implant Name Type Inv.  Item Serial No.  Lot No. LRB No. Used Action   ACRYL-SOF SN 60 WF Intraocular Lens  [id number] [employer] [id number] Left 1 Implanted       Drains: * No LDAs found *    Findings: cataract    Electronically Signed by Cori Solomon MD on 1/31/2022 at 12:45 PM

## 2022-01-31 NOTE — DISCHARGE INSTRUCTIONS
[facility]YE [facility]      POST OPERATIVE INSTRUCTIONS AND INFORMATION         1. THE FIRST 24 HOURS AFTER SURGERY, YOU WILL BE ASKED TO REMAIN AT BEDREST WITH YOUR HEAD ELEVATED AT 39 DEGREES THIS CAN BE DONE BY SLEEPING ON THE PILLOWS OR IN A RECLINER. YOU CAN GET UP AS NECESSARY. IF AN EYE SHIELD IS PLACED, IT  MUST REMAIN FIRMLY IN PLACE FOR THE FIRST 24 HOURS. IT WILL BE REMOVED IN THE OFFICE ON THE DAY FOLLOWING SURGERY WHEN I EXAMINE YOUR EYE. IF YOU ARE DISCHARGED WITH SUN GLASSES, THEY ARE TO BE WORN UNTIL BEDTIME WHEN AN EYE SHIELD IS TO BE TAPED OVER THE OPERATED EYE FOR SLEEP. 2.  YOU WILL NEED TO BRING ALL EYE MEDICATIONS TO YOUR APPOINTMENT THE DAY AFTER SURGERY. 3.   YOUR POST OP VISIT SCHEDULE IS AS FOLLOWS:             * ONE DAY AFTER SURGERY             * ONE WEEK AFTER SURGERY             * SIX WEEKS AFTER SURGERY (SPECTACLE REFRACTION AND DILATED EXAMINATION)    4. IT IS IMPORTANT THAT YOU WEAR EYE PROTECTION AT ALL TIMES FOR THE      FIRST WEEK AFTER [facility] CATARACT SURGERY. DURING THE DAY, YOU WILL NEED TO WEAR EITHER OUR CURRENT SPECTACLES WITH A PLAIN WINDOW GLASS LENS OR YOU MAY WISH TO PURCHASE A PAIR OF DRUG STORE BIFOCALS WITH A POWER OF +2.50 OR SO. WHEN OUTSIDE, YOU MUST WEAR THE SOLAR RODRIGUEZ THAT ARE PROVIDED FOR YOU. AT BEDTIME, YOU MUST WEAR THE EYE SHIELD THAT IS ALSO PROVIDED. AGAIN THIS IS FOR THE FIRST WEEK FOLLOWING YOUR SURGERY.     5. IMPORTANT DOS AND DONTS:             *AVOID CONSTIPATION             *DO NOT PARTICIPATE IN SPORTS OR STRENUOUS PHYSICAL ACTIVITY INCLUDING                         SEXUAL RELATIONS             *DO NOT DRIVE FOR ONE WEEK OFTER EACH CATARACT SURGERY             *AVOID POWDERS, SPRAYS, OR OTHER THINGS THAT MIGHT GET IN YOUR EYES             *DO NOT RUB YOUR EYE OR PUT ANY PRESSURE ON YOUR EYE    6. IF YOUR HAIR IS WASHED BY SOMEONE ELSE, HAVE THEM POSITION YOU SO THAT YOU LEAN YOUR HEAD BACKWARDS INTO THE SINK TO AVOID SOAPY WATER FROM GETTING IN YOUR EYES. YOU SHOULD ALSO WEAR YOUR EYE SHIELD TO PREVENT INJURY OR CONTAMINATION TO THE EYE. 7. DO NOT HESITATE TO CALL OUR OFFICE IF YOU FEEL SOMETHING IS NOT RIGHT OR YOU HAVE ANY QUESTIONS, UNUSUAL SYMPTOMS, OR SUDDEN CHANGES IN YOUR VISION. 8. OUR TELEPHONE NUMBERS[facility] OFFICE              [phone]              *[place] OFFICE         [phone]    9. [address] YOU FOR ENTRUSTING YOUR EYE CARE TO US.    10.  YOU HAVE RECEIVED SEDATION AS PART OF YOUR PROCEDURE. NO DRIVING OR OPERATING HEAVY MACHINERY FOR 24 HOURS. YOUR                       BALANCE AND JUDGEMENT MAY BE IMPAIRED. DROWSINESS MAY ALSO OCCUR.

## 2023-05-15 RX ORDER — ESCITALOPRAM OXALATE 20 MG/1
20 TABLET ORAL
COMMUNITY

## 2023-05-15 RX ORDER — COVID-19 ANTIGEN TEST
220 KIT MISCELLANEOUS DAILY
COMMUNITY

## 2023-05-15 RX ORDER — SIMVASTATIN 40 MG
TABLET ORAL
COMMUNITY

## 2023-05-15 RX ORDER — PREDNISOLONE ACETATE 10 MG/ML
SUSPENSION/ DROPS OPHTHALMIC
COMMUNITY
Start: 2022-01-27

## 2023-05-15 RX ORDER — HYDROCHLOROTHIAZIDE 25 MG/1
25 TABLET ORAL DAILY
COMMUNITY

## 2023-05-15 RX ORDER — TOBRAMYCIN 3 MG/ML
SOLUTION/ DROPS OPHTHALMIC
COMMUNITY
Start: 2022-01-27

## 2023-05-15 RX ORDER — BUPROPION HYDROCHLORIDE 300 MG/1
300 TABLET ORAL DAILY
COMMUNITY

## (undated) DEVICE — B-H IRRIGATING CAN 19GA FLAT ANGLED 8MM: Brand: OPHTHALMIC CANNULA

## (undated) DEVICE — GLOVE ORANGE PI 8   MSG9080

## (undated) DEVICE — LABEL STERILIZATION W/PEN -- 50/CA

## (undated) DEVICE — KIT PT CARE CATRCT POSTOP CUST

## (undated) DEVICE — MICROSURGICAL INSTRUMENT IRR. CYSTITOME 25GA FORMED-REVERSE CUTTING: Brand: ALCON

## (undated) DEVICE — TAPE ADH CLTH SILK H2O REPELLENT CURAD

## (undated) DEVICE — MICROSURGICAL INSTRUMENT CAPSULE POLISHER-37 BEND, 21GA W .3MM SIDE PORT: Brand: ALCON

## (undated) DEVICE — SOLUTION IRRIG 250ML STRL H2O PLAS POUR BTL USP

## (undated) DEVICE — OPHTHALMIC KNIFE 15°: Brand: ALCON

## (undated) DEVICE — Device

## (undated) DEVICE — SOL IRR SOD CL 0.9% 250ML BTL --

## (undated) DEVICE — MICROSURGICAL INSTRUMENT ANTERIOR CHAMBER CANNULA 30GA: Brand: ALCON

## (undated) DEVICE — TIPLESS W/ 0.9 MM HIGH INFUSION SLEEVES: Brand: ALCON, INFINITI, MICROSMOOTH

## (undated) DEVICE — BETADINE 5% EYE SOL

## (undated) DEVICE — TAPE ADH W1INXL10YD CLTH SILK H2O RESIST CURAD

## (undated) DEVICE — 45° KELMAN®, 0.9 MM TURBOSONICS® MINI-FLARED ABS® TIP: Brand: ALCON, KELMAN, TURBOSONICS, MINI-FLARED ABS